# Patient Record
Sex: FEMALE | Race: WHITE | NOT HISPANIC OR LATINO | Employment: FULL TIME | URBAN - METROPOLITAN AREA
[De-identification: names, ages, dates, MRNs, and addresses within clinical notes are randomized per-mention and may not be internally consistent; named-entity substitution may affect disease eponyms.]

---

## 2024-09-04 ENCOUNTER — OFFICE VISIT (OUTPATIENT)
Dept: FAMILY MEDICINE | Facility: CLINIC | Age: 59
End: 2024-09-04
Payer: COMMERCIAL

## 2024-09-04 VITALS
SYSTOLIC BLOOD PRESSURE: 120 MMHG | OXYGEN SATURATION: 98 % | HEART RATE: 62 BPM | DIASTOLIC BLOOD PRESSURE: 80 MMHG | BODY MASS INDEX: 29.54 KG/M2 | WEIGHT: 183.8 LBS | HEIGHT: 66 IN | RESPIRATION RATE: 20 BRPM | TEMPERATURE: 98.1 F

## 2024-09-04 DIAGNOSIS — Z11.4 SCREENING FOR HIV (HUMAN IMMUNODEFICIENCY VIRUS): ICD-10-CM

## 2024-09-04 DIAGNOSIS — Z11.59 NEED FOR HEPATITIS C SCREENING TEST: ICD-10-CM

## 2024-09-04 DIAGNOSIS — Z12.11 SCREEN FOR COLON CANCER: ICD-10-CM

## 2024-09-04 DIAGNOSIS — Z00.00 ROUTINE HISTORY AND PHYSICAL EXAMINATION OF ADULT: Primary | ICD-10-CM

## 2024-09-04 DIAGNOSIS — Z12.31 VISIT FOR SCREENING MAMMOGRAM: ICD-10-CM

## 2024-09-04 DIAGNOSIS — Z12.4 CERVICAL CANCER SCREENING: ICD-10-CM

## 2024-09-04 LAB
CHOLEST SERPL-MCNC: 225 MG/DL
FASTING STATUS PATIENT QL REPORTED: ABNORMAL
FASTING STATUS PATIENT QL REPORTED: ABNORMAL
GLUCOSE SERPL-MCNC: 100 MG/DL (ref 70–99)
HCV AB SERPL QL IA: NONREACTIVE
HDLC SERPL-MCNC: 91 MG/DL
HIV 1+2 AB+HIV1 P24 AG SERPL QL IA: NONREACTIVE
LDLC SERPL CALC-MCNC: 114 MG/DL
NONHDLC SERPL-MCNC: 134 MG/DL
TRIGL SERPL-MCNC: 101 MG/DL

## 2024-09-04 PROCEDURE — 99386 PREV VISIT NEW AGE 40-64: CPT | Performed by: FAMILY MEDICINE

## 2024-09-04 PROCEDURE — 36415 COLL VENOUS BLD VENIPUNCTURE: CPT | Performed by: FAMILY MEDICINE

## 2024-09-04 PROCEDURE — 82947 ASSAY GLUCOSE BLOOD QUANT: CPT | Performed by: FAMILY MEDICINE

## 2024-09-04 PROCEDURE — 80061 LIPID PANEL: CPT | Performed by: FAMILY MEDICINE

## 2024-09-04 PROCEDURE — 86803 HEPATITIS C AB TEST: CPT | Performed by: FAMILY MEDICINE

## 2024-09-04 PROCEDURE — 87389 HIV-1 AG W/HIV-1&-2 AB AG IA: CPT | Performed by: FAMILY MEDICINE

## 2024-09-04 ASSESSMENT — PAIN SCALES - GENERAL: PAINLEVEL: NO PAIN (0)

## 2024-09-04 NOTE — LETTER
September 13, 2024      Radha Nascimento  [address] [facility] [address]        Dear ,    We are writing to inform you of your test results.    Lipid panel consistent with elevated cholesterol levels, other lab results unremarkable.     Will recommend regular exercise, healthy diet and weight loss.     Dr Walls     The 10-year ASCVD risk score (Mely HUANG, et al., 2019) is: 2%    Values used to calculate the score:      Age: 59 years      Sex: Female      Is Non- : No      Diabetic: No      Tobacco smoker: No      Systolic Blood Pressure: 120 mmHg      Is BP treated: No      HDL Cholesterol: 91 mg/dL      Total Cholesterol: 225 mg/dL    Resulted Orders   Lipid panel reflex to direct LDL Non-fasting   Result Value Ref Range    Cholesterol 225 (H) <200 mg/dL    Triglycerides 101 <150 mg/dL    Direct Measure HDL 91 >=50 mg/dL    LDL Cholesterol Calculated 114 (H) <=100 mg/dL    Non HDL Cholesterol 134 (H) <130 mg/dL    Patient Fasting > 8hrs? Unknown     Narrative    Cholesterol  Desirable:  <200 mg/dL    Triglycerides  Normal:  Less than 150 mg/dL  Borderline High:  150-199 mg/dL  High:  200-499 mg/dL  Very High:  Greater than or equal to 500 mg/dL    Direct Measure HDL  Female:  Greater than or equal to 50 mg/dL   Male:  Greater than or equal to 40 mg/dL    LDL Cholesterol  Desirable:  <100mg/dL  Above Desirable:  100-129 mg/dL   Borderline High:  130-159 mg/dL   High:  160-189 mg/dL   Very High:  >= 190 mg/dL    Non HDL Cholesterol  Desirable:  130 mg/dL  Above Desirable:  130-159 mg/dL  Borderline High:  160-189 mg/dL  High:  190-219 mg/dL  Very High:  Greater than or equal to 220 mg/dL   Hepatitis C Screen Reflex to HCV RNA Quant and Genotype   Result Value Ref Range    Hepatitis C Antibody Nonreactive Nonreactive      Comment:      A nonreactive screening test result does not exclude the possibility of exposure to or infection with HCV. Nonreactive screening test results  in individuals with prior exposure to HCV may be due to antibody levels below the limit of detection of this assay or lack of reactivity to the HCV antigens used in this assay. Patients with recent HCV infections (<3 months from time of exposure) may have false-negative HCV antibody results due to the time needed for seroconversion (average of 8 to 9 weeks).       If you have any questions or concerns, please call the clinic at the number listed above.       Sincerely,      Didier Walls MD

## 2024-09-04 NOTE — PROGRESS NOTES
"Preventive Care Visit  [facility]  Didier Walls MD, Family Medicine  Sep 4, 2024      Assessment & Plan     Routine history and physical examination of adult  Visit for screening mammogram  Screen for colon cancer  Cervical cancer screening  Medically doing well.  Recommended to continue regular exercise, healthy diet and weight loss.  Patient deferred colon, breast, cervical cancer screening and routine vaccines  - Glucose; Future  - Lipid panel reflex to direct LDL Non-fasting; Future    Screening for HIV (human immunodeficiency virus)  - HIV Antigen Antibody Combo; Future    Need for hepatitis C screening test  - Hepatitis C Screen Reflex to HCV RNA Quant and Genotype; Future      BMI  Estimated body mass index is 30.12 kg/m  as calculated from the following:    Height as of this encounter: 1.664 m (5' 5.5\").    Weight as of this encounter: 83.4 kg (183 lb 12.8 oz).   Weight management plan: Discussed healthy diet and exercise guidelines    Counseling  Appropriate preventive services were addressed with this patient via screening, questionnaire, or discussion as appropriate for fall prevention, nutrition, physical activity, Tobacco-use cessation, social engagement, weight loss and cognition.  Checklist reviewing preventive services available has been given to the patient.  Reviewed patient's diet, addressing concerns and/or questions.   The patient was instructed to see the dentist every 6 months.         Gianna Garcia is a 59 year old, presenting for the following:  Physical and Establish Care        9/4/2024     8:10 AM   Additional Questions   Roomed by Abbey AVENDAÑO CMA          HPI        9/3/2024   General Health   How would you rate your overall physical health? Good   Feel stress (tense, anxious, or unable to sleep) Not at all            9/3/2024   Nutrition   Three or more servings of calcium each day? Yes   Diet: Regular (no restrictions)   How many servings of fruit " and vegetables per day? (!) I DON'T KNOW   How many sweetened beverages each day? 0-1            9/3/2024   Exercise   Days per week of moderate/strenous exercise 4 days   Average minutes spent exercising at this level 60 min            9/3/2024   Social Factors   Frequency of gathering with friends or relatives Three times a week   Worry food won't last until get money to buy more No   Food not last or not have enough money for food? No   Do you have housing? (Housing is defined as stable permanent housing and does not include staying ouside in a car, in a tent, in an abandoned building, in an overnight shelter, or couch-surfing.) Yes   Are you worried about losing your housing? No   Lack of transportation? No   Unable to get utilities (heat,electricity)? No            9/3/2024   Fall Risk   Fallen 2 or more times in the past year? No   Trouble with walking or balance? No             9/3/2024   Dental   Dentist two times every year? (!) NO            9/3/2024   TB Screening   Were you born outside of the [place]? No            Today's PHQ-2 Score:       9/3/2024     9:31 AM   PHQ-2 ( 1999 [employer])   Q1: Little interest or pleasure in doing things 0   Q2: Feeling down, depressed or hopeless 0   PHQ-2 Score 0   Q1: Little interest or pleasure in doing things Not at all   Q2: Feeling down, depressed or hopeless Not at all   PHQ-2 Score 0           9/3/2024   Substance Use   Alcohol more than 3/day or more than 7/wk No   Do you use any other substances recreationally? No        Social History     Tobacco Use    Smoking status: Never    Smokeless tobacco: Never   Vaping Use    Vaping status: Never Used   Substance Use Topics    Alcohol use: Never    Drug use: Never                 9/3/2024   One time HIV Screening   Previous HIV test? I don't know          9/3/2024   STI Screening   New sexual partner(s) since last STI/HIV test? No        History of  Pap smear: - other categories - see link Cervical Cytology Screening  "Guidelines       ASCVD Risk   The ASCVD Risk score (Mely HUANG, et al., 2019) failed to calculate for the following reasons:    Cannot find a previous HDL lab    Cannot find a previous total cholesterol lab         Reviewed and updated as needed this visit by Provider                    Past Medical History:   Diagnosis Date    Depressive disorder 2003     Past Surgical History:   Procedure Laterality Date    APPENDECTOMY  1977    EYE SURGERY  1983    Cross eye    GYN SURGERY  1992    D & C     OB History   No obstetric history on file.     Lab work is in process  Labs reviewed in EPIC  BP Readings from Last 3 Encounters:   09/04/24 120/80    Wt Readings from Last 3 Encounters:   09/04/24 83.4 kg (183 lb 12.8 oz)                  There is no problem list on file for this patient.    Past Surgical History:   Procedure Laterality Date    APPENDECTOMY  1977    EYE SURGERY  1983    Cross eye    GYN SURGERY  1992    D & C       Social History     Tobacco Use    Smoking status: Never    Smokeless tobacco: Never   Substance Use Topics    Alcohol use: Never     Family History   Problem Relation Age of Onset    Diabetes Mother     Coronary Artery Disease Mother     Hypertension Mother     Hyperlipidemia Mother     Cerebrovascular Disease Mother     Diabetes Father     Coronary Artery Disease Father     Hypertension Father     Hyperlipidemia Father     Hypertension Brother          No current outpatient medications on file.     No Known Allergies  No lab results found.       Review of Systems  Constitutional, neuro, ENT, endocrine, pulmonary, cardiac, gastrointestinal, genitourinary, musculoskeletal, integument and psychiatric systems are negative, except as otherwise noted.     Objective    Exam  /80 (BP Location: Right arm, Patient Position: Sitting, Cuff Size: Adult Regular)   Pulse 62   Temp 98.1  F (36.7  C) (Tympanic)   Resp 20   Ht 1.664 m (5' 5.5\")   Wt 83.4 kg (183 lb 12.8 oz)   LMP  (LMP Unknown) " "  SpO2 98%   BMI 30.12 kg/m     Estimated body mass index is 30.12 kg/m  as calculated from the following:    Height as of this encounter: 1.664 m (5' 5.5\").    Weight as of this encounter: 83.4 kg (183 lb 12.8 oz).    Physical Exam  GENERAL: alert and no distress  EYES: Eyes grossly normal to inspection, PERRL and conjunctivae and sclerae normal  HENT: normal cephalic/atraumatic, nose and mouth without ulcers or lesions, oropharynx clear, and oral mucous membranes moist  NECK: no adenopathy, no asymmetry, masses, or scars  RESP: lungs clear to auscultation - no rales, rhonchi or wheezes  CV: regular rate and rhythm, normal S1 S2, no S3 or S4, no murmur, click or rub, no peripheral edema  ABDOMEN: soft, nontender, no hepatosplenomegaly, no masses   MS: no gross musculoskeletal defects noted, no edema  SKIN: no suspicious lesions or rashes  NEURO: Normal strength and tone, mentation intact and speech normal  PSYCH: mentation appears normal, affect normal/bright      Signed Electronically by: Didier Walls MD    "

## 2024-10-11 ENCOUNTER — TELEPHONE (OUTPATIENT)
Dept: FAMILY MEDICINE | Facility: CLINIC | Age: 59
End: 2024-10-11
Payer: COMMERCIAL

## 2024-10-13 ENCOUNTER — HEALTH MAINTENANCE LETTER (OUTPATIENT)
Age: 59
End: 2024-10-13

## 2024-12-30 ENCOUNTER — TELEPHONE (OUTPATIENT)
Dept: FAMILY MEDICINE | Facility: CLINIC | Age: 59
End: 2024-12-30
Payer: COMMERCIAL

## 2024-12-30 NOTE — TELEPHONE ENCOUNTER
Patient Quality Outreach    Patient is due for the following:   Colon Cancer Screening  Breast Cancer Screening - Mammogram  Cervical Cancer Screening - PAP Needed    Action(s) Taken:   Schedule a office visit for PAP    Type of outreach:    Sent [employer] message.    Questions for provider review:    None           Mariaelena Arevalo MA

## 2025-01-10 ENCOUNTER — TRANSFERRED RECORDS (OUTPATIENT)
Dept: HEALTH INFORMATION MANAGEMENT | Facility: CLINIC | Age: 60
End: 2025-01-10
Payer: COMMERCIAL

## 2025-01-13 ENCOUNTER — TRANSCRIBE ORDERS (OUTPATIENT)
Dept: OTHER | Age: 60
End: 2025-01-13

## 2025-01-13 DIAGNOSIS — H53.2 DIPLOPIA: Primary | ICD-10-CM

## 2025-02-18 ENCOUNTER — OFFICE VISIT (OUTPATIENT)
Dept: URGENT CARE | Facility: URGENT CARE | Age: 60
End: 2025-02-18
Payer: COMMERCIAL

## 2025-02-18 ENCOUNTER — ANCILLARY PROCEDURE (OUTPATIENT)
Dept: GENERAL RADIOLOGY | Facility: CLINIC | Age: 60
End: 2025-02-18
Attending: PHYSICIAN ASSISTANT
Payer: COMMERCIAL

## 2025-02-18 VITALS
RESPIRATION RATE: 16 BRPM | DIASTOLIC BLOOD PRESSURE: 73 MMHG | TEMPERATURE: 99.1 F | WEIGHT: 198 LBS | OXYGEN SATURATION: 97 % | BODY MASS INDEX: 32.45 KG/M2 | SYSTOLIC BLOOD PRESSURE: 129 MMHG | HEART RATE: 66 BPM

## 2025-02-18 DIAGNOSIS — R05.1 ACUTE COUGH: Primary | ICD-10-CM

## 2025-02-18 DIAGNOSIS — R05.1 ACUTE COUGH: ICD-10-CM

## 2025-02-18 LAB
DEPRECATED S PYO AG THROAT QL EIA: NEGATIVE
FLUAV AG SPEC QL IA: NEGATIVE
FLUBV AG SPEC QL IA: NEGATIVE
S PYO DNA THROAT QL NAA+PROBE: NOT DETECTED

## 2025-02-18 PROCEDURE — 87804 INFLUENZA ASSAY W/OPTIC: CPT | Performed by: PHYSICIAN ASSISTANT

## 2025-02-18 PROCEDURE — 87635 SARS-COV-2 COVID-19 AMP PRB: CPT | Performed by: PHYSICIAN ASSISTANT

## 2025-02-18 PROCEDURE — 71046 X-RAY EXAM CHEST 2 VIEWS: CPT | Mod: TC | Performed by: RADIOLOGY

## 2025-02-18 PROCEDURE — 87651 STREP A DNA AMP PROBE: CPT | Performed by: PHYSICIAN ASSISTANT

## 2025-02-18 PROCEDURE — 99214 OFFICE O/P EST MOD 30 MIN: CPT | Performed by: PHYSICIAN ASSISTANT

## 2025-02-18 RX ORDER — ALBUTEROL SULFATE 90 UG/1
INHALANT RESPIRATORY (INHALATION)
Qty: 18 G | Refills: 0 | Status: SHIPPED | OUTPATIENT
Start: 2025-02-18

## 2025-02-18 RX ORDER — PREDNISONE 20 MG/1
40 TABLET ORAL DAILY
Qty: 10 TABLET | Refills: 0 | Status: SHIPPED | OUTPATIENT
Start: 2025-02-18 | End: 2025-02-23

## 2025-02-18 NOTE — PROGRESS NOTES
1. 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 Left hypotropia  3. Left eye amblyopia    History of strabismus since childhood with two procedures at age 3 and 18 to correct esotropia, now presenting with exotropia and left hypotropia and increasingly bothersome diplopia.  She does not appear to have stable fusion today on testing and has admittedly had diplopia for many years. Historically she has ignored her left eye image under binocular conditions (ignored diplopia) except more recently when driving in last few years this has become very bothersome.    Today we discussed the risks, benefits, and alternatives of strabismus surgery. We discussed that there will be a highly likely reconstructive benefit to her strabismus surgery.  We discussed that the functional benefit to her vision is less clear but that my hope is that her diplopia will be easier to ignore again after strabismus surgery but it is likely that it will persist in some fashion. She may require a second strabismus surgery for optimal outcome and/or might require ground in prism glasses for optimal vision.  Her strabismus is too large today to treat with prisms effectively.    After this discussion that includes a full risks, benefits, and alternatives discussion documented below, the patient wishes to proceed. Plan for bilateral strabismus surgery with possible use of adjustable suture.    Radha Nascimento is a pleasant 59 year old White female who presents to my neuro-ophthalmology clinic today having been referred by Dr. Campos for consecutive exotropia.    HPI:    Patient was seen on 1/10/25 by Dr. Campos, who noted patient had consecutive exotropia and right hypertropia. She had esotropia repair when she was 18. Patient is complaining of diplopia. She was noted to have strabismic amblyopia by Dr. Campos at the visit as well.     Per patient, she had strabismus surgery when she was 3 and 18, both  times to correct an esotropia. She believes it was mainly her left eye which was crossed in, and she did patching of her right eye when growing up. Wore glasses when she was 2 years old.    After her last surgery when she was 18, she believes her eyes were decently aligned, although she did have some double vision that she was able to control. Over the year, she has noticed her left eye drifts out more and her double vision has worsened, especially at nighttime when driving (currently covering one of her eyes while driving at night). She believes that the changes have been gradual and did not occur suddenly.     Original surgery in [place], [place]; second procedure likely in [place], [place].    Independent historians:  Patient    Review of outside testing:  None    My interpretation performed today of outside testing:  None    Review of outside clinical notes:           -- Visit with Dr. Campos    Past medical history:  None, denies HTN, diabetes,     Medication:  Patient currently on prednisone 40mg for 5 days and albuterol q4-6h PRN for acute respiratory illness.    Family history:  Patient's family history includes Cerebrovascular Disease in her mother; Coronary Artery Disease in her father and mother; Diabetes in her father and mother; Hyperlipidemia in her father and mother; Hypertension in her brother, father, and mother.   Sister with strabismus and had surgical repair as a child    Social history:  Patient smoked in college reactionally, otherwise no smoking history. No vaping. No other drug use.     Occupation: Nurse - [employer]    Exam:  Please see epic chart for complete exam.     Tests ordered and interpreted today:  Sensorimotor exam showed a -1.5 adduction deficit in the right eye and a -1 adduction deficit in the left eye.  Motility was otherwise essentially full.  Alternate cover testing grossly under measured the angle of her strabismus based upon Krimsky indicating an  abnormal angle kappa In this case.  By Krimky at near she measured a 35 prism diopter left exotropia while on alternate cover testing she measured only 16 prism diopters.  For optimal vision the patient seemed to see single (albeit unstable) with 25 prism diopters base in and 14-16 prism diopters right hypertropia correction in primary gaze.  This is what we will target with her surgery.    We discussed in detail the risks, benefits, and alternatives of eye muscle correction surgery including the very rare risk of death or serious morbidity from a general anesthesia complication and the rare risk of severe vision loss in the operative eye(s) secondary to retinal detachment or endophthalmitis.  We discussed more likely sub-optimal outcomes including the unanticipated need for additional strabismus surgery.  Finally the patient was aware that prisms glasses may be required to optimize single vision following surgery.    After a thorough discussion of these risks, the patient decided to proceed with strabismus surgery.  The surgical plan is as follows:     Bilateral eye muscle correction.  Possible use of adjustable suture in 1 or both eyes.    Follow-up 1 week after strabismus surgery.    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       Precharting and clinical care:  Eddie Muse MD    45 minutes were spent on the date of the encounter by me doing chart review, history and exam, documentation, and further activities as noted above    Complete documentation of historical and exam elements from today's encounter can be found in the full encounter summary report (not reduplicated in this progress note).  I personally obtained the chief complaint(s) and history of  present illness.  I confirmed and edited as necessary the review of systems, past medical/surgical history, family history, social history, and examination findings as documented by others; and I examined the patient myself.  I personally reviewed the relevant tests, images, and reports as documented above.  I formulated and edited as necessary the assessment and plan and discussed the findings and management plan with the patient and family.  I personally reviewed the ophthalmic test(s) associated with this encounter, agree with the interpretation(s) as documented by the resident/fellow, and have edited the corresponding report(s) as necessary.     Davon Grant MD

## 2025-02-18 NOTE — PROGRESS NOTES
Assessment & Plan     Acute cough  Reassurance, chest x-ray is clear. Will treat with prednisone burst and albuterol every 4-6 hrs as needed. Continue with supportive care. Return to clinic if symptoms worsen or do not improve; otherwise follow up as needed     - Influenza A & B Antigen - Clinic Collect  - COVID-19 Virus (Coronavirus) by PCR Nose  - Streptococcus A Rapid Screen w/Reflex to PCR - Clinic Collect  - Group A Streptococcus PCR Throat Swab  - XR Chest 2 Views; Future  - predniSONE (DELTASONE) 20 MG tablet; Take 2 tablets (40 mg) by mouth daily for 5 days.  - albuterol (PROAIR HFA/PROVENTIL HFA/VENTOLIN HFA) 108 (90 Base) MCG/ACT inhaler; Inhale 2 puffs every 4-6 hours as needed for cough, wheezing, or shortness of breath                Return in about 1 week (around 2/25/2025), or if symptoms worsen or fail to improve.        Subjective   Chief Complaint   Patient presents with    URI     X 1 week,cough, sore throat, body aches, hears gurgling when lying down, short of breath, noticed some wheezing at night then goes away with coughing       HPI     URI Adult    Onset of symptoms was 1 week(s) ago.  Course of illness is same.    Severity moderate  Current and Associated symptoms: productive cough, sore throat, body aches  Treatment measures tried include Tylenol/Ibuprofen.  Predisposing factors include None.                    Objective    /73   Pulse 66   Temp 99.1  F (37.3  C) (Tympanic)   Resp 16   Wt 89.8 kg (198 lb)   LMP  (LMP Unknown)   SpO2 97%   BMI 32.45 kg/m    Body mass index is 32.45 kg/m .        Physical Exam  Constitutional:       Appearance: She is well-developed.   HENT:      Head: Normocephalic.      Right Ear: Tympanic membrane and ear canal normal.      Left Ear: Tympanic membrane and ear canal normal.   Eyes:      Conjunctiva/sclera: Conjunctivae normal.   Cardiovascular:      Rate and Rhythm: Normal rate.      Heart sounds: Normal heart sounds.   Pulmonary:       Effort: Pulmonary effort is normal.      Breath sounds: Normal breath sounds.   Skin:     General: Skin is warm and dry.      Findings: No rash.   Psychiatric:         Behavior: Behavior normal.            CXR - Reviewed and interpreted by me: no acute infiltrate         Signed Electronically by: Suri Yeager PA-C

## 2025-02-19 ENCOUNTER — OFFICE VISIT (OUTPATIENT)
Dept: OPHTHALMOLOGY | Facility: CLINIC | Age: 60
End: 2025-02-19
Attending: OPHTHALMOLOGY
Payer: COMMERCIAL

## 2025-02-19 DIAGNOSIS — H53.2 DOUBLE VISION: Primary | ICD-10-CM

## 2025-02-19 DIAGNOSIS — H50.112 EXOTROPIA OF LEFT EYE: ICD-10-CM

## 2025-02-19 DIAGNOSIS — H50.22 HYPOTROPIA OF LEFT EYE: ICD-10-CM

## 2025-02-19 DIAGNOSIS — H53.2 DIPLOPIA: ICD-10-CM

## 2025-02-19 DIAGNOSIS — H53.10 SUBJECTIVE VISUAL DISTURBANCE: ICD-10-CM

## 2025-02-19 LAB — SARS-COV-2 RNA RESP QL NAA+PROBE: NEGATIVE

## 2025-02-19 PROCEDURE — 99214 OFFICE O/P EST MOD 30 MIN: CPT | Performed by: OPHTHALMOLOGY

## 2025-02-19 PROCEDURE — 92060 SENSORIMOTOR EXAMINATION: CPT | Performed by: OPHTHALMOLOGY

## 2025-02-19 PROCEDURE — 92060 SENSORIMOTOR EXAMINATION: CPT

## 2025-02-19 ASSESSMENT — CONF VISUAL FIELD
OS_NORMAL: 1
METHOD: COUNTING FINGERS
OD_INFERIOR_NASAL_RESTRICTION: 0
OS_SUPERIOR_TEMPORAL_RESTRICTION: 0
OD_NORMAL: 1
OD_INFERIOR_TEMPORAL_RESTRICTION: 0
OD_SUPERIOR_NASAL_RESTRICTION: 0
OD_SUPERIOR_TEMPORAL_RESTRICTION: 0
OS_INFERIOR_TEMPORAL_RESTRICTION: 0
OS_SUPERIOR_NASAL_RESTRICTION: 0
OS_INFERIOR_NASAL_RESTRICTION: 0

## 2025-02-19 ASSESSMENT — EXTERNAL EXAM - RIGHT EYE: OD_EXAM: NORMAL

## 2025-02-19 ASSESSMENT — VISUAL ACUITY
METHOD: SNELLEN - LINEAR
OS_CC: 20/40
OD_CC+: -1
CORRECTION_TYPE: GLASSES
OD_CC: 20/20
OS_CC+: +2

## 2025-02-19 ASSESSMENT — TONOMETRY
IOP_METHOD: ICARE
OS_IOP_MMHG: 13
OD_IOP_MMHG: 13

## 2025-02-19 ASSESSMENT — REFRACTION_WEARINGRX
OS_AXIS: 088
OD_AXIS: 091
OD_CYLINDER: +3.50
OD_SPHERE: +1.25
OS_CYLINDER: +2.50
OS_ADD: +2.50
OD_ADD: +2.50
OS_SPHERE: +1.50
SPECS_TYPE: PAL

## 2025-02-19 ASSESSMENT — SLIT LAMP EXAM - LIDS
COMMENTS: COLLARETTES
COMMENTS: COLLARETTES

## 2025-02-19 ASSESSMENT — CUP TO DISC RATIO
OS_RATIO: 0.3
OD_RATIO: 0.45

## 2025-02-19 ASSESSMENT — EXTERNAL EXAM - LEFT EYE: OS_EXAM: NORMAL

## 2025-02-19 NOTE — LETTER
2025    RE: Radha Nascimento  : 1965  MRN: [id number]    Dear Dr. Campos    Thank you for referring your patient, Radha Nascimento, to my neuro-ophthalmology clinic recently.  After a thorough neuro-ophthalmic history and examination, I came to the following conclusions:     1. 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 Left hypotropia  3. Left eye amblyopia    History of strabismus since childhood with two procedures at age 3 and 18 to correct esotropia, now presenting with exotropia and left hypotropia and increasingly bothersome diplopia.  She does not appear to have stable fusion today on testing and has admittedly had diplopia for many years. Historically she has ignored her left eye image under binocular conditions (ignored diplopia) except more recently when driving in last few years this has become very bothersome.    Today we discussed the risks, benefits, and alternatives of strabismus surgery. We discussed that there will be a highly likely reconstructive benefit to her strabismus surgery.  We discussed that the functional benefit to her vision is less clear but that my hope is that her diplopia will be easier to ignore again after strabismus surgery but it is likely that it will persist in some fashion. She may require a second strabismus surgery for optimal outcome and/or might require ground in prism glasses for optimal vision.  Her strabismus is too large today to treat with prisms effectively.    After this discussion that includes a full risks, benefits, and alternatives discussion documented below, the patient wishes to proceed. Plan for bilateral strabismus surgery with possible use of adjustable suture.    Radha Nascimento is a pleasant 59 year old White female who presents to my neuro-ophthalmology clinic today having been referred by Dr. Campos for consecutive exotropia.  HPI: Patient was seen on 1/10/25 by Dr. Campos, who  noted patient had consecutive exotropia and right hypertropia. She had esotropia repair when she was 18. Patient is complaining of diplopia. She was noted to have strabismic amblyopia by Dr. Campos at the visit as well.     Per patient, she had strabismus surgery when she was 3 and 18, both times to correct an esotropia. She believes it was mainly her left eye which was crossed in, and she did patching of her right eye when growing up. Wore glasses when she was 2 years old.    After her last surgery when she was 18, she believes her eyes were decently aligned, although she did have some double vision that she was able to control. Over the year, she has noticed her left eye drifts out more and her double vision has worsened, especially at nighttime when driving (currently covering one of her eyes while driving at night). She believes that the changes have been gradual and did not occur suddenly.     Original surgery in [place], [place]; second procedure likely in [place], [place].    Independent historians: Patient    Review of outside testing: None    My interpretation performed today of outside testing:  None    Review of outside clinical notes:       -- Visit with Dr. Campos    Past medical history: None, denies HTN, diabetes,     Medication: Patient currently on prednisone 40mg for 5 days and albuterol q4-6h PRN for acute respiratory illness.    Family history: Patient's family history includes Cerebrovascular Disease in her mother; Coronary Artery Disease in her father and mother; Diabetes in her father and mother; Hyperlipidemia in her father and mother; Hypertension in her brother, father, and mother.   Sister with strabismus and had surgical repair as a child  Social history:  Patient smoked in college reactionally, otherwise no smoking history. No vaping. No other drug use.     Occupation: Nurse - [employer]    Exam:  Please see epic chart for complete exam.     Tests ordered and  interpreted today:  Sensorimotor exam showed a -1.5 adduction deficit in the right eye and a -1 adduction deficit in the left eye.  Motility was otherwise essentially full.  Alternate cover testing grossly under measured the angle of her strabismus based upon Krimsky indicating an abnormal angle kappa In this case.  By Krimky at near she measured a 35 prism diopter left exotropia while on alternate cover testing she measured only 16 prism diopters.  For optimal vision the patient seemed to see single (albeit unstable) with 25 prism diopters base in and 14-16 prism diopters right hypertropia correction in primary gaze.  This is what we will target with her surgery.    We discussed in detail the risks, benefits, and alternatives of eye muscle correction surgery including the very rare risk of death or serious morbidity from a general anesthesia complication and the rare risk of severe vision loss in the operative eye(s) secondary to retinal detachment or endophthalmitis.  We discussed more likely sub-optimal outcomes including the unanticipated need for additional strabismus surgery.  Finally the patient was aware that prisms glasses may be required to optimize single vision following surgery.    After a thorough discussion of these risks, the patient decided to proceed with strabismus surgery.  The surgical plan is as follows:     Bilateral eye muscle correction.  Possible use of adjustable suture in 1 or both eyes.    Follow-up 1 week after strabismus surgery.    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    Again, thank you for trusting me with the care of your patient.  For further exam details, please feel free to contact our office for additional records.  If you  wish to contact me regarding this patient please email me at [email or web address] or give my clinic a call to arrange a phone conversation.    Sincerely,    Davon Grant MD  , Neuro-Ophthalmology and Adult Strabismus Surgery  The [facility] TAN and Vilma Emerson Chair in Neuro-Ophthalmology  Department of Ophthalmology and Visual Neurosciences  [facility]    DX: Congenital strabismus, exotropia, strabismus surgery

## 2025-02-19 NOTE — NURSING NOTE
Chief Complaint(s) and History of Present Illness(es)       Strabismus Evaluation    In both eyes.  Disease is present since childhood.  Characterized as oblique.  Occurring constantly.  Since onset it is gradually worsening.  Associated symptoms include eye pain.  Negative for blurred vision and headaches.  Treatments tried include glasses, patching and surgery.  Pain was noted as 0/10.             Comments    Radha Nascimento is a 59 year old female sent for consultation by Dr. Champion for strabismus surgery eval.  Patient with hx of x2 strab sx at 3 years of age and again at 18 for left esotropia.  She also has hx of strab amblyopia left eye and eye patching.  Radha reports left eye drifting outward, gradually worsening within the last year.  She gets double vision constantly.  By evening time, she is mainly closing the left eye to focus.  No eye pain but eyes do feel tired. No blurry vision with glasses.     MARCELINO Brothers 2/19/2025 12:28 PM

## 2025-03-10 ENCOUNTER — OFFICE VISIT (OUTPATIENT)
Dept: FAMILY MEDICINE | Facility: CLINIC | Age: 60
End: 2025-03-10
Payer: COMMERCIAL

## 2025-03-10 VITALS
RESPIRATION RATE: 20 BRPM | WEIGHT: 200.8 LBS | DIASTOLIC BLOOD PRESSURE: 70 MMHG | TEMPERATURE: 100.1 F | HEART RATE: 79 BPM | OXYGEN SATURATION: 97 % | SYSTOLIC BLOOD PRESSURE: 108 MMHG | HEIGHT: 66 IN | BODY MASS INDEX: 32.27 KG/M2

## 2025-03-10 DIAGNOSIS — Z01.818 PREOP GENERAL PHYSICAL EXAM: Primary | ICD-10-CM

## 2025-03-10 DIAGNOSIS — H53.2 DOUBLE VISION: ICD-10-CM

## 2025-03-10 DIAGNOSIS — H50.9 STRABISMUS: ICD-10-CM

## 2025-03-10 PROCEDURE — 3078F DIAST BP <80 MM HG: CPT | Performed by: FAMILY MEDICINE

## 2025-03-10 PROCEDURE — 1126F AMNT PAIN NOTED NONE PRSNT: CPT | Performed by: FAMILY MEDICINE

## 2025-03-10 PROCEDURE — 99214 OFFICE O/P EST MOD 30 MIN: CPT | Performed by: FAMILY MEDICINE

## 2025-03-10 PROCEDURE — 3074F SYST BP LT 130 MM HG: CPT | Performed by: FAMILY MEDICINE

## 2025-03-10 ASSESSMENT — PAIN SCALES - GENERAL: PAINLEVEL_OUTOF10: NO PAIN (0)

## 2025-03-10 NOTE — PROGRESS NOTES
Preoperative Evaluation  [facility]  [address] [place] [id number]  Phone: [phone]  Fax: [phone]  Primary Provider: Didier Walls MD  Pre-op Performing Provider: Didier Walls MD  Mar 10, 2025             3/7/2025   Surgical Information   What procedure is being done? Bilateral eye muscle repair   Facility or Hospital where procedure/surgery will be performed: [facility]   Who is doing the procedure / surgery? Dr Salazar   Date of surgery / procedure: 4-7-25   Time of surgery / procedure: Unknown   Where do you plan to recover after surgery? at home alone     Fax number for surgical facility: Note does not need to be faxed, will be available electronically in Epic.    Assessment & Plan     The proposed surgical procedure is considered LOW risk.      ICD-10-CM    1. Preop general physical exam  Z01.818       2. Double vision  H53.2            - No identified additional risk factors other than previously addressed    Antiplatelet or Anticoagulation Medication Instructions   - We reviewed the medication list and the patient is not on an antiplatelet or anticoagulation medications.    Additional Medication Instructions  We reviewed the medication list and there are no chronic medications that need to be adjusted for this procedure.    Recommendation  Low grade fever can be related to common cold.  Recommended to follow-up with RN in 1 week for vitals check.  Approval given to proceed with proposed procedure, without further diagnostic evaluation.    Gianna Garcia is a 59 year old, presenting for the following:  Pre-Op Exam          3/10/2025     1:45 PM   Additional Questions   Roomed by Mariaelena SQUIRES LPN   Accompanied by self     HPI:   59-year-old female presents for a preop physical exam.  Patient is scheduled to have bilateral eye muscle correction on April 7, 2025.  She requires evaluation and anesthesia risk assessment prior to undergoing  surgery/procedure.  Patient denies any fever, chills, sore throat, cough, shortness of breath, chest pain, palpitation, diarrhea, constipation, abdominal pain, headache or other relevant systemic symptoms.            3/10/2025   Pre-Op Questionnaire   Have you ever had a heart attack or stroke? No    Have you ever had surgery on your heart or blood vessels, such as a stent placement, a coronary artery bypass, or surgery on an artery in your head, neck, heart, or legs? No    Do you have chest pain with activity? No    Do you have a history of heart failure? No    Do you currently have a cold, bronchitis or symptoms of other infection? No    Do you have a cough, shortness of breath, or wheezing? No    Do you or anyone in your family have previous history of blood clots? No    Do you or does anyone in your family have a serious bleeding problem such as prolonged bleeding following surgeries or cuts? No    Have you ever had problems with anemia or been told to take iron pills? No    Have you had any abnormal blood loss such as black, tarry or bloody stools, or abnormal vaginal bleeding? No    Have you ever had a blood transfusion? No    Are you willing to have a blood transfusion if it is medically needed before, during, or after your surgery? Yes    Have you or any of your relatives ever had problems with anesthesia? No    Do you have sleep apnea, excessive snoring or daytime drowsiness? No    Do you have any artifical heart valves or other implanted medical devices like a pacemaker, defibrillator, or continuous glucose monitor? No    Do you have artificial joints? No    Are you allergic to latex? No        Proxy-reported       Preoperative Review of    reviewed - no record of controlled substances prescribed.      Status of Chronic Conditions:  See problem list for active medical problems.  Problems all longstanding and stable, except as noted/documented.  See ROS for pertinent symptoms related to these  "conditions.    Patient Active Problem List    Diagnosis Date Noted    Double vision 02/19/2025     Priority: Medium      Past Medical History:   Diagnosis Date    Depressive disorder 2003     Past Surgical History:   Procedure Laterality Date    APPENDECTOMY  1977    EYE SURGERY  1983    [place] [facility]    GYN SURGERY  1992    D & C     Current Outpatient Medications   Medication Sig Dispense Refill    albuterol (PROAIR HFA/PROVENTIL HFA/VENTOLIN HFA) 108 (90 Base) MCG/ACT inhaler Inhale 2 puffs every 4-6 hours as needed for cough, wheezing, or shortness of breath 18 g 0       No Known Allergies     Social History     Tobacco Use    Smoking status: Never    Smokeless tobacco: Never   Substance Use Topics    Alcohol use: Never     Family History   Problem Relation Age of Onset    Diabetes Mother     Coronary Artery Disease Mother     Hypertension Mother     Hyperlipidemia Mother     Cerebrovascular Disease Mother     Diabetes Father     Coronary Artery Disease Father     Hypertension Father     Hyperlipidemia Father     Hypertension Brother      History   Drug Use Unknown         Review of Systems  Constitutional, neuro, ENT, endocrine, pulmonary, cardiac, gastrointestinal, genitourinary, musculoskeletal, integument and psychiatric systems are negative, except as otherwise noted.    Objective    /70   Pulse 79   Temp 100.1  F (37.8  C) (Tympanic)   Resp 20   Ht 1.664 m (5' 5.5\")   Wt 91.1 kg (200 lb 12.8 oz)   LMP  (LMP Unknown)   SpO2 97%   BMI 32.91 kg/m     Estimated body mass index is 32.91 kg/m  as calculated from the following:    Height as of this encounter: 1.664 m (5' 5.5\").    Weight as of this encounter: 91.1 kg (200 lb 12.8 oz).  Physical Exam  GENERAL: alert and no distress  EYES: PERRL, EOMI, and strabismus left eye  HENT: normal cephalic/atraumatic, nose and mouth without ulcers or lesions, oropharynx clear, and oral mucous membranes moist  NECK: no adenopathy, no asymmetry, masses, or " "scars  RESP: lungs clear to auscultation - no rales, rhonchi or wheezes  CV: regular rate and rhythm, normal S1 S2, no S3 or S4, no murmur, click or rub, no peripheral edema  ABDOMEN: soft, nontender, no hepatosplenomegaly, no masses and bowel sounds normal  MS: no gross musculoskeletal defects noted, no edema  SKIN: no suspicious lesions or rashes  NEURO: Normal strength and tone, mentation intact and speech normal  PSYCH: mentation appears normal, affect normal/bright    No results for input(s): \"HGB\", \"PLT\", \"INR\", \"NA\", \"POTASSIUM\", \"CR\", \"A1C\" in the last 8760 hours.     Diagnostics  No labs were ordered during this visit.   No EKG required, no history of coronary heart disease, significant arrhythmia, peripheral arterial disease or other structural heart disease.    Revised Cardiac Risk Index (RCRI)  The patient has the following serious cardiovascular risks for perioperative complications:   - No serious cardiac risks = 0 points     RCRI Interpretation: 0 points: Class I (very low risk - 0.4% complication rate)         Signed Electronically by: Didier Walls MD  A copy of this evaluation report is provided to the requesting physician.         "

## 2025-03-11 ENCOUNTER — TELEPHONE (OUTPATIENT)
Dept: FAMILY MEDICINE | Facility: CLINIC | Age: 60
End: 2025-03-11
Payer: COMMERCIAL

## 2025-03-11 NOTE — TELEPHONE ENCOUNTER
Patient Quality Outreach    Patient is due for the following:   Colon Cancer Screening  Breast Cancer Screening - Mammogram  Cervical Cancer Screening - PAP Needed    Action(s) Taken:   Schedule a office visit for PAP    Type of outreach:    Sent [employer] message.    Questions for provider review:    None           Mariaelena Arevalo MA

## 2025-03-18 ENCOUNTER — ALLIED HEALTH/NURSE VISIT (OUTPATIENT)
Dept: FAMILY MEDICINE | Facility: CLINIC | Age: 60
End: 2025-03-18
Payer: COMMERCIAL

## 2025-03-18 VITALS
HEART RATE: 74 BPM | OXYGEN SATURATION: 96 % | SYSTOLIC BLOOD PRESSURE: 112 MMHG | RESPIRATION RATE: 20 BRPM | TEMPERATURE: 98.5 F | DIASTOLIC BLOOD PRESSURE: 78 MMHG

## 2025-03-18 DIAGNOSIS — R68.89 ABNORMAL VITAL SIGNS: Primary | ICD-10-CM

## 2025-03-18 PROCEDURE — 99207 PR NO CHARGE NURSE ONLY: CPT

## 2025-03-18 PROCEDURE — 1126F AMNT PAIN NOTED NONE PRSNT: CPT

## 2025-03-18 PROCEDURE — 3078F DIAST BP <80 MM HG: CPT

## 2025-03-18 PROCEDURE — 3074F SYST BP LT 130 MM HG: CPT

## 2025-03-18 ASSESSMENT — PAIN SCALES - GENERAL: PAINLEVEL_OUTOF10: NO PAIN (0)

## 2025-03-18 NOTE — PROGRESS NOTES
Patient presented for vitals check per Dr. Walls.     /78 (BP Location: Right arm, Patient Position: Sitting, Cuff Size: Adult Large)   Pulse 74   Temp 98.5  F (36.9  C) (Tympanic)   Resp 20   LMP  (LMP Unknown)   SpO2 96%       All vitals looked good, so patient was discharged home.     Bailee Kahler on 3/18/2025 at 2:19 PM

## 2025-04-03 ENCOUNTER — ANESTHESIA EVENT (OUTPATIENT)
Dept: SURGERY | Facility: AMBULATORY SURGERY CENTER | Age: 60
End: 2025-04-03
Payer: COMMERCIAL

## 2025-04-03 RX ORDER — OXYCODONE HYDROCHLORIDE 5 MG/1
10 TABLET ORAL
Status: CANCELLED | OUTPATIENT
Start: 2025-04-03

## 2025-04-03 RX ORDER — ACETAMINOPHEN 325 MG/1
975 TABLET ORAL ONCE
Status: CANCELLED | OUTPATIENT
Start: 2025-04-03 | End: 2025-04-03

## 2025-04-03 RX ORDER — DEXAMETHASONE SODIUM PHOSPHATE 10 MG/ML
4 INJECTION, SOLUTION INTRAMUSCULAR; INTRAVENOUS
Status: CANCELLED | OUTPATIENT
Start: 2025-04-03

## 2025-04-03 RX ORDER — OXYCODONE HYDROCHLORIDE 5 MG/1
5 TABLET ORAL
Status: CANCELLED | OUTPATIENT
Start: 2025-04-03

## 2025-04-03 RX ORDER — SODIUM CHLORIDE, SODIUM LACTATE, POTASSIUM CHLORIDE, CALCIUM CHLORIDE 600; 310; 30; 20 MG/100ML; MG/100ML; MG/100ML; MG/100ML
INJECTION, SOLUTION INTRAVENOUS CONTINUOUS
Status: CANCELLED | OUTPATIENT
Start: 2025-04-03

## 2025-04-03 RX ORDER — ONDANSETRON 2 MG/ML
4 INJECTION INTRAMUSCULAR; INTRAVENOUS EVERY 30 MIN PRN
Status: CANCELLED | OUTPATIENT
Start: 2025-04-03

## 2025-04-03 RX ORDER — NALOXONE HYDROCHLORIDE 0.4 MG/ML
0.1 INJECTION, SOLUTION INTRAMUSCULAR; INTRAVENOUS; SUBCUTANEOUS
Status: CANCELLED | OUTPATIENT
Start: 2025-04-03

## 2025-04-03 RX ORDER — LIDOCAINE 40 MG/G
CREAM TOPICAL
Status: CANCELLED | OUTPATIENT
Start: 2025-04-03

## 2025-04-03 RX ORDER — ONDANSETRON 4 MG/1
4 TABLET, ORALLY DISINTEGRATING ORAL EVERY 30 MIN PRN
Status: CANCELLED | OUTPATIENT
Start: 2025-04-03

## 2025-04-06 NOTE — ANESTHESIA PREPROCEDURE EVALUATION
"Anesthesia Pre-Procedure Evaluation    Patient: Radha Nascimento   MRN: [id number] : 1965        Procedure : Procedure(s):  Bilateral eye muscle correction. Possible use of adjustable suture in one or both eyes.          Past Medical History:   Diagnosis Date     Depressive disorder       Past Surgical History:   Procedure Laterality Date     APPENDECTOMY       EYE SURGERY      [place] [facility]     GYN SURGERY      D & C      No Known Allergies   Social History     Tobacco Use     Smoking status: Never     Smokeless tobacco: Never   Substance Use Topics     Alcohol use: Never      Wt Readings from Last 1 Encounters:   03/10/25 91.1 kg (200 lb 12.8 oz)        Anesthesia Evaluation   Pt has had prior anesthetic. Type: General and MAC.        ROS/MED HX  ENT/Pulmonary:  - neg pulmonary ROS     Neurologic:  - neg neurologic ROS     Cardiovascular:  - neg cardiovascular ROS     METS/Exercise Tolerance: >4 METS    Hematologic:  - neg hematologic  ROS     Musculoskeletal:  - neg musculoskeletal ROS     GI/Hepatic:  - neg GI/hepatic ROS     Renal/Genitourinary:  - neg Renal ROS     Endo:     (+)               Obesity,       Psychiatric/Substance Use:     (+) psychiatric history depression       Infectious Disease:  - neg infectious disease ROS     Malignancy:  - neg malignancy ROS     Other: Comment: Double vision           Physical Exam    Airway        Mallampati: II   TM distance: > 3 FB   Neck ROM: full   Mouth opening: > 3 cm    Respiratory Devices and Support         Dental       (+) Modest Abnormalities - crowns, retainers, 1 or 2 missing teeth    B=Bridge, C=Chipped, L=Loose, M=Missing    Cardiovascular   cardiovascular exam normal       Rhythm and rate: regular     Pulmonary   pulmonary exam normal        breath sounds clear to auscultation       OUTSIDE LABS:  CBC: No results found for: \"WBC\", \"HGB\", \"HCT\", \"PLT\"  BMP:   Lab Results   Component Value Date     (H) 2024     COAGS: No " "results found for: \"PTT\", \"INR\", \"FIBR\"  POC:   Lab Results   Component Value Date    HCG Positive (A) 11/01/2002     HEPATIC: No results found for: \"ALBUMIN\", \"PROTTOTAL\", \"ALT\", \"AST\", \"GGT\", \"ALKPHOS\", \"BILITOTAL\", \"BILIDIRECT\", \"AMBREEN\"  OTHER: No results found for: \"PH\", \"LACT\", \"A1C\", \"DERICK\", \"PHOS\", \"MAG\", \"LIPASE\", \"AMYLASE\", \"TSH\", \"T4\", \"T3\", \"CRP\", \"SED\"    Anesthesia Plan    ASA Status:  2    NPO Status:  NPO Appropriate    Anesthesia Type: General.     - Airway: LMA   Induction: Intravenous.   Maintenance: TIVA.        Consents    Anesthesia Plan(s) and associated risks, benefits, and realistic alternatives discussed. Questions answered and patient/representative(s) expressed understanding.     - Discussed:     - Discussed with:  Patient            Postoperative Care    Pain management: Multi-modal analgesia.   PONV prophylaxis: Background Propofol Infusion, Ondansetron (or other 5HT-3), Dexamethasone or Solumedrol     Comments:               Soledad Prasad MD    Clinically Significant Risk Factors Present on Admission                             # Obesity: Estimated body mass index is 32.91 kg/m  as calculated from the following:    Height as of 3/10/25: 1.664 m (5' 5.5\").    Weight as of 3/10/25: 91.1 kg (200 lb 12.8 oz).                "

## 2025-04-07 ENCOUNTER — ANESTHESIA (OUTPATIENT)
Dept: SURGERY | Facility: AMBULATORY SURGERY CENTER | Age: 60
End: 2025-04-07
Payer: COMMERCIAL

## 2025-04-07 ENCOUNTER — HOSPITAL ENCOUNTER (OUTPATIENT)
Facility: AMBULATORY SURGERY CENTER | Age: 60
Discharge: HOME | End: 2025-04-07
Attending: OPHTHALMOLOGY
Payer: COMMERCIAL

## 2025-04-07 VITALS
WEIGHT: 190 LBS | OXYGEN SATURATION: 96 % | TEMPERATURE: 97.2 F | RESPIRATION RATE: 16 BRPM | SYSTOLIC BLOOD PRESSURE: 126 MMHG | BODY MASS INDEX: 31.65 KG/M2 | HEIGHT: 65 IN | HEART RATE: 56 BPM | DIASTOLIC BLOOD PRESSURE: 67 MMHG

## 2025-04-07 DIAGNOSIS — Z98.890 STATUS POST EYE SURGERY: Primary | ICD-10-CM

## 2025-04-07 PROCEDURE — 67311 REVISE EYE MUSCLE: CPT | Mod: RT

## 2025-04-07 PROCEDURE — 67335 EYE SUTURE DURING SURGERY: CPT | Mod: RT

## 2025-04-07 PROCEDURE — 67332 REREVISE EYE MUSCLES ADD-ON: CPT | Mod: XS,LT

## 2025-04-07 PROCEDURE — 67314 REVISE EYE MUSCLE: CPT | Mod: RT | Performed by: OPHTHALMOLOGY

## 2025-04-07 PROCEDURE — 67332 REREVISE EYE MUSCLES ADD-ON: CPT | Mod: RT | Performed by: OPHTHALMOLOGY

## 2025-04-07 PROCEDURE — 67311 REVISE EYE MUSCLE: CPT | Mod: 50 | Performed by: OPHTHALMOLOGY

## 2025-04-07 PROCEDURE — 67335 EYE SUTURE DURING SURGERY: CPT | Mod: RT | Performed by: OPHTHALMOLOGY

## 2025-04-07 RX ORDER — KETOROLAC TROMETHAMINE 30 MG/ML
INJECTION, SOLUTION INTRAMUSCULAR; INTRAVENOUS PRN
Status: DISCONTINUED | OUTPATIENT
Start: 2025-04-07 | End: 2025-04-07

## 2025-04-07 RX ORDER — LIDOCAINE HYDROCHLORIDE 20 MG/ML
INJECTION, SOLUTION INFILTRATION; PERINEURAL PRN
Status: DISCONTINUED | OUTPATIENT
Start: 2025-04-07 | End: 2025-04-07

## 2025-04-07 RX ORDER — FENTANYL CITRATE 50 UG/ML
25 INJECTION, SOLUTION INTRAMUSCULAR; INTRAVENOUS EVERY 5 MIN PRN
Status: DISCONTINUED | OUTPATIENT
Start: 2025-04-07 | End: 2025-04-08 | Stop reason: HOSPADM

## 2025-04-07 RX ORDER — PROPOFOL 10 MG/ML
INJECTION, EMULSION INTRAVENOUS CONTINUOUS PRN
Status: DISCONTINUED | OUTPATIENT
Start: 2025-04-07 | End: 2025-04-07

## 2025-04-07 RX ORDER — ONDANSETRON 2 MG/ML
INJECTION INTRAMUSCULAR; INTRAVENOUS PRN
Status: DISCONTINUED | OUTPATIENT
Start: 2025-04-07 | End: 2025-04-07

## 2025-04-07 RX ORDER — OXYMETAZOLINE HYDROCHLORIDE 0.05 G/100ML
SPRAY NASAL PRN
Status: DISCONTINUED | OUTPATIENT
Start: 2025-04-07 | End: 2025-04-07 | Stop reason: HOSPADM

## 2025-04-07 RX ORDER — FENTANYL CITRATE 50 UG/ML
50 INJECTION, SOLUTION INTRAMUSCULAR; INTRAVENOUS EVERY 5 MIN PRN
Status: DISCONTINUED | OUTPATIENT
Start: 2025-04-07 | End: 2025-04-08 | Stop reason: HOSPADM

## 2025-04-07 RX ORDER — SODIUM CHLORIDE, SODIUM LACTATE, POTASSIUM CHLORIDE, CALCIUM CHLORIDE 600; 310; 30; 20 MG/100ML; MG/100ML; MG/100ML; MG/100ML
INJECTION, SOLUTION INTRAVENOUS CONTINUOUS
Status: DISCONTINUED | OUTPATIENT
Start: 2025-04-07 | End: 2025-04-08 | Stop reason: HOSPADM

## 2025-04-07 RX ORDER — SODIUM CHLORIDE, SODIUM LACTATE, POTASSIUM CHLORIDE, CALCIUM CHLORIDE 600; 310; 30; 20 MG/100ML; MG/100ML; MG/100ML; MG/100ML
INJECTION, SOLUTION INTRAVENOUS CONTINUOUS
Status: DISCONTINUED | OUTPATIENT
Start: 2025-04-07 | End: 2025-04-07 | Stop reason: HOSPADM

## 2025-04-07 RX ORDER — LIDOCAINE 40 MG/G
CREAM TOPICAL
Status: DISCONTINUED | OUTPATIENT
Start: 2025-04-07 | End: 2025-04-07 | Stop reason: HOSPADM

## 2025-04-07 RX ORDER — TETRACAINE HYDROCHLORIDE 5 MG/ML
1 SOLUTION OPHTHALMIC
Status: DISCONTINUED | OUTPATIENT
Start: 2025-04-07 | End: 2025-04-08 | Stop reason: HOSPADM

## 2025-04-07 RX ORDER — DEXAMETHASONE SODIUM PHOSPHATE 10 MG/ML
4 INJECTION, SOLUTION INTRAMUSCULAR; INTRAVENOUS
Status: DISCONTINUED | OUTPATIENT
Start: 2025-04-07 | End: 2025-04-08 | Stop reason: HOSPADM

## 2025-04-07 RX ORDER — BALANCED SALT SOLUTION 6.4; .75; .48; .3; 3.9; 1.7 MG/ML; MG/ML; MG/ML; MG/ML; MG/ML; MG/ML
SOLUTION OPHTHALMIC PRN
Status: DISCONTINUED | OUTPATIENT
Start: 2025-04-07 | End: 2025-04-07 | Stop reason: HOSPADM

## 2025-04-07 RX ORDER — ACETAMINOPHEN 325 MG/1
975 TABLET ORAL ONCE
Status: COMPLETED | OUTPATIENT
Start: 2025-04-07 | End: 2025-04-07

## 2025-04-07 RX ORDER — DEXAMETHASONE SODIUM PHOSPHATE 4 MG/ML
INJECTION, SOLUTION INTRA-ARTICULAR; INTRALESIONAL; INTRAMUSCULAR; INTRAVENOUS; SOFT TISSUE PRN
Status: DISCONTINUED | OUTPATIENT
Start: 2025-04-07 | End: 2025-04-07

## 2025-04-07 RX ORDER — ONDANSETRON 2 MG/ML
4 INJECTION INTRAMUSCULAR; INTRAVENOUS EVERY 30 MIN PRN
Status: DISCONTINUED | OUTPATIENT
Start: 2025-04-07 | End: 2025-04-08 | Stop reason: HOSPADM

## 2025-04-07 RX ORDER — HYDROMORPHONE HYDROCHLORIDE 1 MG/ML
0.2 INJECTION, SOLUTION INTRAMUSCULAR; INTRAVENOUS; SUBCUTANEOUS EVERY 5 MIN PRN
Status: DISCONTINUED | OUTPATIENT
Start: 2025-04-07 | End: 2025-04-08 | Stop reason: HOSPADM

## 2025-04-07 RX ORDER — NALOXONE HYDROCHLORIDE 0.4 MG/ML
0.1 INJECTION, SOLUTION INTRAMUSCULAR; INTRAVENOUS; SUBCUTANEOUS
Status: DISCONTINUED | OUTPATIENT
Start: 2025-04-07 | End: 2025-04-08 | Stop reason: HOSPADM

## 2025-04-07 RX ORDER — PROPOFOL 10 MG/ML
INJECTION, EMULSION INTRAVENOUS PRN
Status: DISCONTINUED | OUTPATIENT
Start: 2025-04-07 | End: 2025-04-07

## 2025-04-07 RX ORDER — HYDROMORPHONE HYDROCHLORIDE 1 MG/ML
0.4 INJECTION, SOLUTION INTRAMUSCULAR; INTRAVENOUS; SUBCUTANEOUS EVERY 5 MIN PRN
Status: DISCONTINUED | OUTPATIENT
Start: 2025-04-07 | End: 2025-04-08 | Stop reason: HOSPADM

## 2025-04-07 RX ORDER — ONDANSETRON 4 MG/1
4 TABLET, ORALLY DISINTEGRATING ORAL EVERY 30 MIN PRN
Status: DISCONTINUED | OUTPATIENT
Start: 2025-04-07 | End: 2025-04-08 | Stop reason: HOSPADM

## 2025-04-07 RX ORDER — PREDNISOLONE ACETATE 10 MG/ML
SUSPENSION/ DROPS OPHTHALMIC
Qty: 10 ML | Refills: 0 | Status: SHIPPED | OUTPATIENT
Start: 2025-04-07

## 2025-04-07 RX ORDER — FENTANYL CITRATE 50 UG/ML
INJECTION, SOLUTION INTRAMUSCULAR; INTRAVENOUS PRN
Status: DISCONTINUED | OUTPATIENT
Start: 2025-04-07 | End: 2025-04-07

## 2025-04-07 RX ORDER — SODIUM CHLORIDE, SODIUM LACTATE, POTASSIUM CHLORIDE, CALCIUM CHLORIDE 600; 310; 30; 20 MG/100ML; MG/100ML; MG/100ML; MG/100ML
INJECTION, SOLUTION INTRAVENOUS CONTINUOUS PRN
Status: DISCONTINUED | OUTPATIENT
Start: 2025-04-07 | End: 2025-04-07

## 2025-04-07 RX ADMIN — KETOROLAC TROMETHAMINE 30 MG: 30 INJECTION, SOLUTION INTRAMUSCULAR; INTRAVENOUS at 10:11

## 2025-04-07 RX ADMIN — TETRACAINE HYDROCHLORIDE 1 DROP: 5 SOLUTION OPHTHALMIC at 10:41

## 2025-04-07 RX ADMIN — ONDANSETRON 4 MG: 2 INJECTION INTRAMUSCULAR; INTRAVENOUS at 09:05

## 2025-04-07 RX ADMIN — FENTANYL CITRATE 50 MCG: 50 INJECTION, SOLUTION INTRAMUSCULAR; INTRAVENOUS at 10:00

## 2025-04-07 RX ADMIN — ONDANSETRON 4 MG: 2 INJECTION INTRAMUSCULAR; INTRAVENOUS at 10:19

## 2025-04-07 RX ADMIN — TETRACAINE HYDROCHLORIDE 1 DROP: 5 SOLUTION OPHTHALMIC at 11:08

## 2025-04-07 RX ADMIN — SODIUM CHLORIDE, SODIUM LACTATE, POTASSIUM CHLORIDE, CALCIUM CHLORIDE: 600; 310; 30; 20 INJECTION, SOLUTION INTRAVENOUS at 08:57

## 2025-04-07 RX ADMIN — PROPOFOL 150 MCG/KG/MIN: 10 INJECTION, EMULSION INTRAVENOUS at 09:01

## 2025-04-07 RX ADMIN — SODIUM CHLORIDE, SODIUM LACTATE, POTASSIUM CHLORIDE, CALCIUM CHLORIDE: 600; 310; 30; 20 INJECTION, SOLUTION INTRAVENOUS at 08:10

## 2025-04-07 RX ADMIN — DEXAMETHASONE SODIUM PHOSPHATE 4 MG: 4 INJECTION, SOLUTION INTRA-ARTICULAR; INTRALESIONAL; INTRAMUSCULAR; INTRAVENOUS; SOFT TISSUE at 09:05

## 2025-04-07 RX ADMIN — LIDOCAINE HYDROCHLORIDE 100 MG: 20 INJECTION, SOLUTION INFILTRATION; PERINEURAL at 09:00

## 2025-04-07 RX ADMIN — FENTANYL CITRATE 50 MCG: 50 INJECTION, SOLUTION INTRAMUSCULAR; INTRAVENOUS at 09:00

## 2025-04-07 RX ADMIN — PROPOFOL 200 MG: 10 INJECTION, EMULSION INTRAVENOUS at 09:00

## 2025-04-07 RX ADMIN — ACETAMINOPHEN 975 MG: 325 TABLET ORAL at 08:10

## 2025-04-07 NOTE — ANESTHESIA POSTPROCEDURE EVALUATION
Patient: Radha Nascimento    Procedure: Procedure(s):  Bilateral eye muscle correction.with bilateral use of adjustable suture.       Anesthesia Type:  General    Note:  Disposition: Outpatient   Postop Pain Control: Uneventful            Sign Out: Well controlled pain   PONV: No   Neuro/Psych: Uneventful            Sign Out: Acceptable/Baseline neuro status   Airway/Respiratory: Uneventful            Sign Out: Acceptable/Baseline resp. status   CV/Hemodynamics: Uneventful            Sign Out: Acceptable CV status; No obvious hypovolemia; No obvious fluid overload   Other NRE: NONE   DID A NON-ROUTINE EVENT OCCUR? No       Last vitals:  Vitals Value Taken Time   /69 04/07/25 1045   Temp 36.3  C (97.3  F) 04/07/25 1033   Pulse 52 04/07/25 1054   Resp 11 04/07/25 1054   SpO2 95 % 04/07/25 1054   Vitals shown include unfiled device data.    Electronically Signed By: Soledad Prasad MD  April 7, 2025  11:30 AM

## 2025-04-07 NOTE — ANESTHESIA CARE TRANSFER NOTE
Patient: Radha Nascimento    Procedure: Procedure(s):  Bilateral eye muscle correction.with bilateral use of adjustable suture.       Diagnosis: Double vision [H53.2]  Diagnosis Additional Information: No value filed.    Anesthesia Type:   General     Note:      Level of Consciousness: drowsy  Oxygen Supplementation: face mask    Independent Airway: airway patency satisfactory and stable        Patient transferred to: PACU    Handoff Report: Identifed the Patient, Identified the Reponsible Provider, Reviewed the pertinent medical history, Discussed the surgical course, Reviewed Intra-OP anesthesia mangement and issues during anesthesia, Set expectations for post-procedure period and Allowed opportunity for questions and acknowledgement of understanding  Vitals:  Vitals Value Taken Time   BP     Temp     Pulse 61 04/07/25 1034   Resp 8 04/07/25 1034   SpO2 99 % 04/07/25 1034   Vitals shown include unfiled device data.    Electronically Signed By: CARRIE Nielsen CRNA  April 7, 2025  10:35 AM

## 2025-04-07 NOTE — ANESTHESIA PROCEDURE NOTES
Airway       Patient location during procedure: OR  Staff -        CRNA: Loretta Clark APRN CRNA       Performed By: CRNAIndications and Patient Condition       Indications for airway management: mainor-procedural       Induction type:intravenous       Mask difficulty assessment: 1 - vent by mask    Final Airway Details       Final airway type: supraglottic airway    Supraglottic Airway Details        Type: LMA       Brand: LMA Unique       LMA size: 4    Post intubation assessment        Placement verified by: capnometry, equal breath sounds and chest rise        Number of attempts at approach: 1       Secured with: tape       Ease of procedure: easy       Dentition: Intact and Unchanged

## 2025-04-07 NOTE — OP NOTE
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  From early childhood she had infantile esotropia and underwent 2 strabismus surgeries to repair this around age 3 and age 18.  She has had diplopia for years but has ignored it mostly but now finds that she has nearly constant binocular diplopia and also disfiguring strabismus. We discussed the role of strabismus surgery to re-align her eyes and also hopefully place her eyes in a position where she is able to see single under binocular conditions either through fusion or suppression. She did see single in clinic with prism correction of her strabismus in free space. She was highly motivated for strabismus surgery.    I warned the patient about the risks, benefits, and alternatives of eye muscle surgery including a relatively small risk for severe infection, retinal detachment, and permanent vision loss of the eye.  I also discussed the possibility of postoperative double vision.  I discussed the possibility that due to postoperative double vision or a postoperative recurrent strabismus, the patient may require additional strabismus procedures in the future.  Understanding these risks, the patient decided to proceed with  "strabismus surgery.      DESCRIPTION OF PROCEDURE:    After the risks, benefits, and alternatives of eye muscle surgery were discussed with the patient and the patient's questions were answered both in clinic and on the day of surgery, written informed consent was obtained and witnessed in the preoperative holding area on the day of surgery.  The word \"yes\" was written over both eyes indicating that the patient consented to eye muscle correction in both eyes.  An intravenous line was placed and light intravenous sedation was given.  The patient was transported to the operating room where after appropriate monitors were placed, the patient underwent induction of general anesthesia without complication.      Both eyes were prepped and draped in the usual sterile fashion for ophthalmic surgery and a lid speculum was placed in the right eye.  Forced duction testing in this eye revealed no restriction.  A trapezoidal nasal conjunctival flap was created using conjunctival forceps and Michele scissors.  Extensive scarring was encountered indicative of prior strabismus surgery. Careful dissection was performed to separate conjunctiva and scarring of tenon's from underlying sclera. This was reflected backwards to expose the right medial rectus muscle which was isolated using a Mitch muscle hook.  The muscle was cautiously and tediously freed from Tenon's capsule with blunt dissection using a Michele scissors and cotton swab.  The muscle was found to insert 14 mm posterior to the limbus as measured with a caliper indicating a prior recession of approximately 8 mm.  A double armed 6-0 Vicryl suture was then woven across the width of the muscle at its insertion to the globe using a grooved hook.  The muscle was then carefully disinserted from the sclera using Westcotts.  Both arms of the 6-0 Vicryl suture were then passed partial thickness through the sclera in a crossing swords technique at a point measured to be 10 mm " posterior to the limbus.  At this point, the ends of the suture were tied together tightly advancing the muscle 4 mm from it's prior location.The needles were cut off and the ends were cut short.      The trapezoidal nasal conjunctival flap was extended superiorly using conjunctival forceps and Michele scissors.  This was reflected backwards to expose the right superior rectus muscle which was isolated using a [place] muscle hook.  The muscle was freed from Tenon's capsule with blunt dissection using a Michele scissors and cotton swab.  A double armed 6-0 Vicryl suture was then woven across the width of the muscle near it's insertion of the globe and tied to the edges.  The muscle was disinserted from the globe using Michele scissors.  Both arms of the 6-0 Vicryl suture were then passed partial thickness through the sclera at the physiologic muscle insertion in a  crossing swords technique.   At this point, a 6-0 undyed Vicryl suture with needle removed was used to create a sliding  noose  knot allowing for post-operative adjustment.  A 6-0 Vicryl suture on a spatulated needle was then used to make a partial-thickness scleral bite adjacent to the operative muscle physiologic insertion to serve as a traction suture facilitating the adjustment process.  The needles were then cut off of the double armed 6-0 suture on which the extraocular muscle was recessed.  The operative muscle was then recessed 5.5 mm from its original insertion as measured with a caliper.  Viscoelastic agent was then used to provide lubrication to the noose knot so that it would slide freely.  The trapezoidal conjunctival flap was reopposed in the surgical bed and held in place with 6-0 plain gut suture using one permanent suture and one temporary suture (which would be made permanent following adjustment post-operartively).The lid speculum was removed from the operative eye.     A lid speculum was placed in the left eye.  Forced duction  testing in this eye revealed no restriction.  A trapezoidal nasal conjunctival flap was created using conjunctival forceps and Michele scissors.  Extensive scarring was encountered indicative of prior strabismus surgery. Careful dissection was performed to separate conjunctiva and scarring of tenon's from underlying sclera. This was reflected backwards to expose the left medial rectus muscle which was isolated using a [place] muscle hook.  The muscle was cautiously and tediously freed from Tenon's capsule with blunt dissection using a Michele scissors and cotton swab.  The muscle was found to insert 13 mm posterior to the limbus as measured with a caliper indicating a prior recession of approximately 7 mm.  A double armed 6-0 Vicryl suture was then woven across the width of the muscle at its insertion to the globe using a grooved hook.  The muscle was then carefully disinserted from the sclera using Westcotts.  Both arms of the 6-0 Vicryl suture were then passed partial thickness through the sclera in a crossing swords technique at the location of the physiologic muscle insertion 5.5 mm posterior to the limbus and the muscle was allowed to hang back at a point measured to be 8 mm posterior to the limbus (effectively a 5 mm advancement).  At this point, a 6-0 undyed Vicryl suture with needle removed was used to create a sliding  noose  knot allowing for post-operative adjustment.  A 6-0 Vicryl suture on a spatulated needle was then used to make a partial-thickness scleral bite adjacent to the operative muscle physiologic insertion to serve as a traction suture facilitating the adjustment process.  The needles were then cut off of the double armed 6-0 suture on which the extraocular muscle was recessed.    Viscoelastic agent was then used to provide lubrication to the noose knot so that it would slide freely.  The trapezoidal conjunctival flap was reopposed in the surgical bed and held in place with 6-0 plain gut  suture using one permanent suture and one temporary suture (which would be made permanent following adjustment post-operartively).The lid speculum was removed from the operative eye.     Maxitrol ointment was placed in both eyes.  The patient was awakened from general anesthesia without complication and discharged to the recovery room in stable condition.       SPECIMENS REMOVED:  None.      ESTIMATED BLOOD LOSS:  1 mL or less.    INTRAOPERATIVE FLUIDS:  As per anesthesia records.      SPONGE/INSTRUMENT/NEEDLE COUNTS:  All sponge, instrument, and needle counts were correct times two.    CONDITION ON DISCHARGE FROM OPERATING ROOM:  Stable.      COMPLICATIONS:  None.     I was present for the entire procedure    POST-OPERATIVE ADJUSTMENT    After awakening from anesthesia sufficiently to communicate clearly and fixate on a target consistent, the adjustment process began.  Tetracaine topical anesthetic was placed in both eyes and steri-strips holding down the temporary sutures were removed from the face.      Alignment measurements were obtained and showed on alternate cover testing a 10 prism diopter esotropia with no hypertropia. By krimsky the patient was ortho and under binocular conditions she did NOT have diplopia.  A decision was made to keep her muscles in the location designated in the OR.    All sutures were then tied off and ends were cut short.  The adjustment process took an estimated 20 minutes to complete.

## 2025-04-07 NOTE — BRIEF OP NOTE
[facility]    Brief Operative Note     Pre-operative diagnosis: Strabismus [H50.9]  Post-operative diagnosis Same as pre-operative diagnosis    Procedure(s):  Bilateral eye muscle correction.with bilateral use of adjustable suture.    Surgeons and Role:     * Davon Grant MD - Primary     * Eddie Muse MD - Resident - Assisting     * Navin Adkins MD - Fellow - Assisting    Anesthesia: General   Estimated Blood Loss: Less than 1 ml    Drains:   None  Specimens:  * No specimens in log *  Findings:   See full operative report.  Complications:             None.  Implants:  * No implants in log *

## 2025-04-07 NOTE — DISCHARGE INSTRUCTIONS
[facility]  Home Care Following Anesthesia  For 24 hours after surgery:  Get plenty of rest.  A responsible adult must stay with you for at least 24 hours after you leave the surgery center.  Do not drive or use heavy equipment.  If you have weakness or tingling, don't drive or use heavy equipment until this feeling goes away.   Do not drink alcohol.   Avoid strenuous or risky activities.  Ask for help when climbing stairs.  You may feel lightheaded.  IF so, sit for a few minutes before standing.  Have someone help you get up.   If you have nausea (feel sick to your stomach): Drink only clear liquids such as apple juice, ginger ale, broth or 7-Up.  Rest may also help.  Be sure to drink enough fluids.  Move to a regular diet as you feel able.   You may have a slight fever.  Call the doctor if your fever is over 100 F (37.7 C) (taken under the tongue) or lasts longer than 24 hours.  You may have a dry mouth, a sore throat, muscle aches or trouble sleeping. These should go away after 24 hours.  Do not make important or legal decisions.   It is recommended to avoid smoking.               Tips for taking pain medications  To get the best pain relief possible, remember these points:  Take pain medications as directed, before pain becomes severe.  Pain medication can upset your stomach: taking it with food may help.  Constipation is a common side effect of pain medication. Drink plenty of  fluids.  Eat foods high in fiber. Take a stool softener if recommended by your doctor or pharmacist.  Do not drink alcohol, drive or operate machinery while taking pain medications.  Ask about other ways to control pain, such as with heat, ice or relaxation.    Tylenol/Acetaminophen Consumption    If you feel your pain relief is insufficient, you may take Tylenol/Acetaminophen in addition to your narcotic pain medication.   Be careful not to exceed 4,000 mg of Tylenol/Acetaminophen in a 24 hour  period from all sources.  If you are taking extra strength Tylenol/acetaminophen (500 mg), the maximum dose is 8 tablets in 24 hours.  If you are taking regular strength acetaminophen (325 mg), the maximum dose is 12 tablets in 24 hours.    Call a doctor for any of the following:  Signs of infection (fever, growing tenderness at the surgery site, a large amount of drainage or bleeding, severe pain, foul-smelling drainage, redness, swelling).  It has been over 8 to 10 hours since surgery and you are still not able to urinate (pass water).  Headache for over 24 hours.  Numbness, tingling or weakness the day after surgery (if you had spinal anesthesia).  Signs of Covid-19 infection (temperature over 100 degrees, shortness of breath, cough, loss of taste/smell, generalized body aches, persistent headache, chills, sore throat, nausea/vomiting/diarrhea)    Your doctor is:       Dr. Davon Grant, Ophthalmology: [phone]               After hours and weekends call the hospital @ [phone] and ask for the resident on call for:  Ophthalmology  For emergency care, call the:  [facility]:  [phone] (TTY for hearing impaired: [phone])

## 2025-08-05 ENCOUNTER — PATIENT OUTREACH (OUTPATIENT)
Dept: CARE COORDINATION | Facility: CLINIC | Age: 60
End: 2025-08-05
Payer: COMMERCIAL

## (undated) DEVICE — SU VICRYL 6-0 S-29 12" J556G

## (undated) DEVICE — SU VICRYL 6-0 S-14DA 18" UND J670G

## (undated) DEVICE — EYE PREP BETADINE 5% SOLUTION 30ML 0065-0411-30

## (undated) DEVICE — DRSG GAUZE 4X4" 3033

## (undated) DEVICE — PACK MINOR EYE CUSTOM ASC

## (undated) DEVICE — EYE MARKING PAD 581057

## (undated) DEVICE — ESU CORD BIPOLAR GREEN 10-4000

## (undated) DEVICE — LINEN TOWEL PACK X5 5464

## (undated) DEVICE — ESU HOLSTER PLASTIC DISP E2400

## (undated) DEVICE — DRAPE STERI TOWEL LG 1010

## (undated) DEVICE — SOL WATER IRRIG 500ML BOTTLE 2F7113

## (undated) DEVICE — SU PLAIN 6-0 G-1DA 18" 770G

## (undated) DEVICE — GLOVE BIOGEL PI MICRO SZ 7.5 48575

## (undated) RX ORDER — ACETAMINOPHEN 325 MG/1
TABLET ORAL
Status: DISPENSED
Start: 2025-04-07

## (undated) RX ORDER — TETRACAINE HYDROCHLORIDE 5 MG/ML
SOLUTION OPHTHALMIC
Status: DISPENSED
Start: 2025-04-07

## (undated) RX ORDER — FENTANYL CITRATE 50 UG/ML
INJECTION, SOLUTION INTRAMUSCULAR; INTRAVENOUS
Status: DISPENSED
Start: 2025-04-07